# Patient Record
Sex: MALE | Race: WHITE | NOT HISPANIC OR LATINO | Employment: FULL TIME | URBAN - METROPOLITAN AREA
[De-identification: names, ages, dates, MRNs, and addresses within clinical notes are randomized per-mention and may not be internally consistent; named-entity substitution may affect disease eponyms.]

---

## 2017-11-13 ENCOUNTER — OFFICE VISIT (OUTPATIENT)
Dept: URGENT CARE | Facility: URGENT CARE | Age: 27
End: 2017-11-13
Payer: COMMERCIAL

## 2017-11-13 VITALS
OXYGEN SATURATION: 100 % | TEMPERATURE: 98.1 F | SYSTOLIC BLOOD PRESSURE: 149 MMHG | DIASTOLIC BLOOD PRESSURE: 95 MMHG | HEART RATE: 123 BPM

## 2017-11-13 DIAGNOSIS — R30.0 DYSURIA: Primary | ICD-10-CM

## 2017-11-13 LAB
ALBUMIN UR-MCNC: ABNORMAL MG/DL
APPEARANCE UR: CLEAR
BILIRUB UR QL STRIP: NEGATIVE
COLOR UR AUTO: YELLOW
GLUCOSE UR STRIP-MCNC: NEGATIVE MG/DL
HGB UR QL STRIP: ABNORMAL
KETONES UR STRIP-MCNC: ABNORMAL MG/DL
LEUKOCYTE ESTERASE UR QL STRIP: NEGATIVE
MUCOUS THREADS #/AREA URNS LPF: PRESENT /LPF
NITRATE UR QL: NEGATIVE
PH UR STRIP: 6.5 PH (ref 5–7)
RBC #/AREA URNS AUTO: ABNORMAL /HPF
SOURCE: ABNORMAL
SP GR UR STRIP: 1.01 (ref 1–1.03)
UROBILINOGEN UR STRIP-ACNC: 0.2 EU/DL (ref 0.2–1)
WBC #/AREA URNS AUTO: ABNORMAL /HPF

## 2017-11-13 PROCEDURE — 81001 URINALYSIS AUTO W/SCOPE: CPT | Performed by: FAMILY MEDICINE

## 2017-11-13 PROCEDURE — 87086 URINE CULTURE/COLONY COUNT: CPT | Performed by: FAMILY MEDICINE

## 2017-11-13 PROCEDURE — 99202 OFFICE O/P NEW SF 15 MIN: CPT | Performed by: FAMILY MEDICINE

## 2017-11-13 RX ORDER — GRANULES FOR ORAL 3 G/1
3 POWDER ORAL ONCE
Qty: 1 PACKET | Refills: 0 | Status: SHIPPED | OUTPATIENT
Start: 2017-11-13 | End: 2017-11-13

## 2017-11-13 NOTE — MR AVS SNAPSHOT
"              After Visit Summary   2017    Ky Herron    MRN: [id number]           Patient Information     Date Of Birth          1990        Visit Information        Provider Department      2017 6:45 PM Randall Velez MD [facility]        Today's Diagnoses     Dysuria    -  1       Follow-ups after your visit        Who to contact     If you have questions or need follow up information about today's clinic visit or your schedule please contact [facility] directly at [phone].  Normal or non-critical lab and imaging results will be communicated to you by [employer], letter or phone within 4 business days after the clinic has received the results. If you do not hear from us within 7 days, please contact the clinic through [employer] or phone. If you have a critical or abnormal lab result, we will notify you by phone as soon as possible.  Submit refill requests through [employer] or call your pharmacy and they will forward the refill request to us. Please allow 3 business days for your refill to be completed.          Additional Information About Your Visit        MyChart Information     [employer] lets you send messages to your doctor, view your test results, renew your prescriptions, schedule appointments and more. To sign up, go to [email or web address] . Click on \"Log in\" on the left side of the screen, which will take you to the Welcome page. Then click on \"Sign up Now\" on the right side of the page.     You will be asked to enter the access code listed below, as well as some personal information. Please follow the directions to create your username and password.     Your access code is: [email or web address]  Expires: 2018  7:59 PM     Your access code will  in 90 days. If you need help or a new code, please call your [facility] or [phone].        Care EveryWhere ID     This is your Care EveryWhere ID. This could be used by other " organizations to access your [place] medical records  [id number]        Your Vitals Were     Pulse Temperature Pulse Oximetry             123 98.1  F (36.7  C) (Tympanic) 100%          Blood Pressure from Last 3 Encounters:   11/13/17 (!) 149/95    Weight from Last 3 Encounters:   No data found for Wt              We Performed the Following     UA with Microscopic reflex to Culture     Urine Culture Aerobic Bacterial          Today's Medication Changes          These changes are accurate as of: 11/13/17  7:59 PM.  If you have any questions, ask your nurse or doctor.               Start taking these medicines.        Dose/Directions    fosfomycin 3 G Packet   Commonly known as:  MONUROL   Used for:  Dysuria        Dose:  3 g   Take 1 packet (3 g) by mouth once for 1 dose   Quantity:  1 packet   Refills:  0            Where to get your medicines      These medications were sent to [facility] [address] [place] [address] [address] [address]    Hours:  24-hours Phone:  [phone]     fosfomycin 3 G Packet                Primary Care Provider    Provider Outside       No address on file        Equal Access to Services     NISH VILCHIS : Hadii misti lopez hadasho Sovicenteali, waaxda luqadaha, qaybta kaalmada adeteresa, mercedes alvarenga . So [facility] [phone].    ATENCIÓN: Si habla español, tiene a johnson disposición servicios gratuitos de asistencia lingüística. Llame al [phone].    We comply with applicable federal civil rights laws and [place] laws. We do not discriminate on the basis of race, color, national origin, age, disability, sex, sexual orientation, or gender identity.            Thank you!     Thank you for choosing [facility]ARE  for your care. Our goal is always to provide you with excellent care. Hearing back from our patients is one way we can continue to improve our services. Please take a few  minutes to complete the written survey that you may receive in the mail after your visit with us. Thank you!             Your Updated Medication List - Protect others around you: Learn how to safely use, store and throw away your medicines at www.disposemymeds.org.          This list is accurate as of: 11/13/17  7:59 PM.  Always use your most recent med list.                   Brand Name Dispense Instructions for use Diagnosis    fosfomycin 3 G Packet    MONUROL    1 packet    Take 1 packet (3 g) by mouth once for 1 dose    Dysuria

## 2017-11-14 LAB
BACTERIA SPEC CULT: NO GROWTH
SPECIMEN SOURCE: NORMAL

## 2017-11-14 NOTE — NURSING NOTE
Chief Complaint   Patient presents with     Urgent Care     Groin Pain     Left testicle pain that started today.         Initial BP (!) 149/95 (BP Location: Right arm, Cuff Size: Adult Regular)  Pulse 123  Temp 98.1  F (36.7  C) (Tympanic)  SpO2 100% There is no height or weight on file to calculate BMI.  Medication Reconciliation: complete.  CY Cardenas

## 2017-11-14 NOTE — PROGRESS NOTES
SUBJECTIVE:  Chief Complaint   Patient presents with     Urgent Care     Groin Pain     Left testicle pain that started today.       Ky Herron is a 27 year old male   Who has concern of dysuria and L testicle pain  Patient has had no specific symptoms:  No discharge,  no fevers/ chills    Sexually active: no  Predisposing factors: none although had this one year ago that was treated in [place] with fosfomycin.   Saw urology and had negative prostate, scrotal and bladder USD.       ALLERGIES:  Review of patient's allergies indicates no known allergies.        No current outpatient prescriptions on file prior to visit.  No current facility-administered medications on file prior to visit.     Social History   Substance Use Topics     Smoking status: Not on file     Smokeless tobacco: Not on file     Alcohol use Not on file       No family history on file.      ROS:   CONSTITUTIONAL:NEGATIVE for fever, chills, change in weight  INTEGUMENTARY/SKIN: NEGATIVE for worrisome rashes, moles or lesions    OBJECTIVE:  BP (!) 149/95 (BP Location: Right arm, Cuff Size: Adult Regular)  Pulse 123  Temp 98.1  F (36.7  C) (Tympanic)  SpO2 100%    \ exam deferred  GENERAL APPEARANCE: healthy, alert and no distress  CV: regular rates and rhythm, normal S1 S2, no murmur noted  ABDOMEN:  soft, nontender, no HSM or masses and bowel sounds normal  BACK: No CVA tenderness  SKIN: no suspicious lesions or rashes    ASSESSMENT:  1. Dysuria    Needs to see pcp for further recs    - fosfomycin (MONUROL) 3 G Packet; Take 1 packet (3 g) by mouth once for 1 dose  Dispense: 1 packet; Refill: 0  - UA with Microscopic reflex to Culture  - Urine Culture Aerobic Bacterial    Push fluids.   Pt instructed to come back to the clinic for worsening sx

## 2023-08-04 ENCOUNTER — OFFICE VISIT (OUTPATIENT)
Dept: URGENT CARE | Facility: URGENT CARE | Age: 33
End: 2023-08-04
Payer: COMMERCIAL

## 2023-08-04 VITALS
OXYGEN SATURATION: 100 % | DIASTOLIC BLOOD PRESSURE: 86 MMHG | RESPIRATION RATE: 25 BRPM | HEART RATE: 106 BPM | TEMPERATURE: 97.5 F | SYSTOLIC BLOOD PRESSURE: 138 MMHG | WEIGHT: 193.6 LBS

## 2023-08-04 DIAGNOSIS — R07.0 THROAT PAIN: Primary | ICD-10-CM

## 2023-08-04 DIAGNOSIS — J06.9 VIRAL URI: ICD-10-CM

## 2023-08-04 DIAGNOSIS — R03.0 ELEVATED BP WITHOUT DIAGNOSIS OF HYPERTENSION: ICD-10-CM

## 2023-08-04 LAB
DEPRECATED S PYO AG THROAT QL EIA: NEGATIVE
ERYTHROCYTE [DISTWIDTH] IN BLOOD BY AUTOMATED COUNT: 11.6 % (ref 10–15)
GROUP A STREP BY PCR: NOT DETECTED
HCT VFR BLD AUTO: 46.7 % (ref 40–53)
HGB BLD-MCNC: 15.8 G/DL (ref 13.3–17.7)
MCH RBC QN AUTO: 29.5 PG (ref 26.5–33)
MCHC RBC AUTO-ENTMCNC: 33.8 G/DL (ref 31.5–36.5)
MCV RBC AUTO: 87 FL (ref 78–100)
PLATELET # BLD AUTO: 170 10E3/UL (ref 150–450)
RBC # BLD AUTO: 5.35 10E6/UL (ref 4.4–5.9)
WBC # BLD AUTO: 9.2 10E3/UL (ref 4–11)

## 2023-08-04 PROCEDURE — 85027 COMPLETE CBC AUTOMATED: CPT | Performed by: NURSE PRACTITIONER

## 2023-08-04 PROCEDURE — 99204 OFFICE O/P NEW MOD 45 MIN: CPT | Performed by: NURSE PRACTITIONER

## 2023-08-04 PROCEDURE — 36415 COLL VENOUS BLD VENIPUNCTURE: CPT | Performed by: NURSE PRACTITIONER

## 2023-08-04 PROCEDURE — 87651 STREP A DNA AMP PROBE: CPT | Performed by: NURSE PRACTITIONER

## 2023-08-04 NOTE — PROGRESS NOTES
Assessment & Plan     Throat pain  - Streptococcus A Rapid Screen w/Reflex to PCR - Clinic Collect  - Group A Streptococcus PCR Throat Swab  - CBC with platelets  - CBC with platelets    Viral URI     Patient Instructions     Results for orders placed or performed in visit on 08/04/23   CBC with platelets     Status: Normal   Result Value Ref Range    WBC Count 9.2 4.0 - 11.0 10e3/uL    RBC Count 5.35 4.40 - 5.90 10e6/uL    Hemoglobin 15.8 13.3 - 17.7 g/dL    Hematocrit 46.7 40.0 - 53.0 %    MCV 87 78 - 100 fL    MCH 29.5 26.5 - 33.0 pg    MCHC 33.8 31.5 - 36.5 g/dL    RDW 11.6 10.0 - 15.0 %    Platelet Count 170 150 - 450 10e3/uL   Streptococcus A Rapid Screen w/Reflex to PCR - Clinic Collect     Status: Normal    Specimen: Throat; Swab   Result Value Ref Range    Group A Strep antigen Negative Negative     BP remains elevated.  Recheck at home  Follow up with your PCP if this remains elevated.      RST negative  TC swab pending.    Push fluids  Lots of handwashing.   Ibuprofen as needed for fever or pain  Delsym or dayquil/nyquil for cough as needed     Rest as able.   Will call if any other labs positive.    F/u in the clinic if symptoms persist or worsen.          Return in about 1 week (around 8/11/2023) for with regular provider if symptoms persist.    BROWN Amezquita [facility] BARBIE Mcpherson is a 33 year old male who presents to clinic today for the following health issues:  Chief Complaint   Patient presents with    Pharyngitis     X3 days.     HPI    URI Adult    Onset of symptoms was 3 day(s) ago.  Course of illness is worsening.    Severity mild  Current and Associated symptoms: stuffy nose, sore throat, body aches, and fatigue  Treatment measures tried include Tylenol/Ibuprofen, Fluids, and Rest.  Predisposing factors include recent flu like symptoms, seemed to be getting better then sore throat came on.  .    BP up.    Has not checked this recently.    Denies chest pain, SANDY, SOB, dizziness or lightheadedness.  No pain radiating to left arm or jaw.  No reflux.        Review of Systems  Constitutional, HEENT, cardiovascular, pulmonary, gi and gu systems are negative, except as otherwise noted.      Objective    BP (!) 167/98   Pulse 106   Temp 97.5  F (36.4  C) (Tympanic)   Resp 25   Wt 87.8 kg (193 lb 9.6 oz)   SpO2 100%   Physical Exam   GENERAL: healthy, alert and no distress  EYES: Eyes grossly normal to inspection, PERRL and conjunctivae and sclerae normal  HENT: ear canals and TM's normal, nose and mouth without ulcers or lesions  NECK: no adenopathy, no asymmetry, masses, or scars and thyroid normal to palpation  RESP: lungs clear to auscultation - no rales, rhonchi or wheezes  CV: regular rate and rhythm, normal S1 S2, no S3 or S4, no murmur, click or rub, no peripheral edema and peripheral pulses strong  MS: no gross musculoskeletal defects noted, no edema  SKIN: no suspicious lesions or rashes

## 2023-08-04 NOTE — PATIENT INSTRUCTIONS
Results for orders placed or performed in visit on 08/04/23   CBC with platelets     Status: Normal   Result Value Ref Range    WBC Count 9.2 4.0 - 11.0 10e3/uL    RBC Count 5.35 4.40 - 5.90 10e6/uL    Hemoglobin 15.8 13.3 - 17.7 g/dL    Hematocrit 46.7 40.0 - 53.0 %    MCV 87 78 - 100 fL    MCH 29.5 26.5 - 33.0 pg    MCHC 33.8 31.5 - 36.5 g/dL    RDW 11.6 10.0 - 15.0 %    Platelet Count 170 150 - 450 10e3/uL   Streptococcus A Rapid Screen w/Reflex to PCR - Clinic Collect     Status: Normal    Specimen: Throat; Swab   Result Value Ref Range    Group A Strep antigen Negative Negative     BP remains elevated.  Recheck at home  Follow up with your PCP if this remains elevated.      RST negative  TC swab pending.    Push fluids  Lots of handwashing.   Ibuprofen as needed for fever or pain  Delsym or dayquil/nyquil for cough as needed     Rest as able.   Will call if any other labs positive.    F/u in the clinic if symptoms persist or worsen.

## 2023-08-06 ENCOUNTER — HOSPITAL ENCOUNTER (EMERGENCY)
Facility: CLINIC | Age: 33
Discharge: HOME | End: 2023-08-06
Attending: EMERGENCY MEDICINE | Admitting: EMERGENCY MEDICINE
Payer: COMMERCIAL

## 2023-08-06 VITALS
OXYGEN SATURATION: 97 % | RESPIRATION RATE: 18 BRPM | TEMPERATURE: 98.7 F | HEART RATE: 97 BPM | DIASTOLIC BLOOD PRESSURE: 99 MMHG | SYSTOLIC BLOOD PRESSURE: 149 MMHG

## 2023-08-06 DIAGNOSIS — I10 HYPERTENSION, UNSPECIFIED TYPE: ICD-10-CM

## 2023-08-06 DIAGNOSIS — J02.9 PHARYNGITIS, UNSPECIFIED ETIOLOGY: ICD-10-CM

## 2023-08-06 LAB — GROUP A STREP BY PCR: NOT DETECTED

## 2023-08-06 PROCEDURE — 99284 EMERGENCY DEPT VISIT MOD MDM: CPT | Mod: 25

## 2023-08-06 PROCEDURE — 87651 STREP A DNA AMP PROBE: CPT | Performed by: EMERGENCY MEDICINE

## 2023-08-06 PROCEDURE — 96372 THER/PROPH/DIAG INJ SC/IM: CPT | Performed by: EMERGENCY MEDICINE

## 2023-08-06 PROCEDURE — 250N000011 HC RX IP 250 OP 636: Mod: JZ | Performed by: EMERGENCY MEDICINE

## 2023-08-06 RX ORDER — KETOROLAC TROMETHAMINE 15 MG/ML
30 INJECTION, SOLUTION INTRAMUSCULAR; INTRAVENOUS ONCE
Status: COMPLETED | OUTPATIENT
Start: 2023-08-06 | End: 2023-08-06

## 2023-08-06 RX ADMIN — KETOROLAC TROMETHAMINE 30 MG: 15 INJECTION, SOLUTION INTRAMUSCULAR; INTRAVENOUS at 00:52

## 2023-08-06 ASSESSMENT — ACTIVITIES OF DAILY LIVING (ADL): ADLS_ACUITY_SCORE: 35

## 2023-08-06 NOTE — ED PROVIDER NOTES
History     Chief Complaint:  Pharyngitis        HPI   Ky Herron is a 33 year old male who presents with a sore throat.   has been doing a sore throat since 8/1 is progressively gotten worse.   has been using Tylenol with the last dose around 2000 this evening.  He has not been taking any ibuprofen.  He measured a temperature of 99.6 F tonight, but it has not seen anything higher.  He states that he feels like he has had some congestion and some postnasal drip.  He gone to the urgent care who did a strep swab which was negative he was recommended supportive care at that time.  He has been able to drink fluid.      Review of External Notes:    note 8/4/23 discussing sore throat and negative strep swab    Allergies:  No Known Allergies     Medications:    No current outpatient medications on file.      Past Medical History:    No past medical history on file.    Past Surgical History:    No past surgical history on file.     Family History:    family history is not on file.    Social History:   reports that he has never smoked. He has never used smokeless tobacco.  PCP: Outside, Provider     Physical Exam   Patient Vitals for the past 24 hrs:   BP Temp Temp src Pulse Resp SpO2   08/06/23 0220 -- -- -- -- 18 97 %   08/06/23 0215 (!) 149/99 -- -- 97 18 97 %   08/06/23 0055 -- -- -- -- -- 95 %   08/06/23 0031 -- 98.7  F (37.1  C) Oral -- 16 --   08/06/23 0030 -- -- -- -- -- 99 %   08/06/23 0028 (!) 164/111 -- -- (!) 123 -- --        Physical Exam  General: Appears well-developed and well-nourished.   Head: No signs of trauma.   Mouth/Throat: Oropharynx with erythema and exudate of bilateral tonsils.  No significant swelling. Uvula midline  Eyes: Conjunctivae are normal.   Neck: Normal range of motion. No nuchal rigidity. + cervical adenopathy  CV: Normal rate and regular rhythm.    Resp: Effort normal and breath sounds normal. No respiratory distress. ss.  MSK: Normal range of motion.   Neuro: The  patient is alert and oriented. Speech normal.  Skin: Skin is warm and dry. No rash noted.   Psych: normal mood and affect. behavior is normal.       Emergency Department Course     Laboratory:  Labs Ordered and Resulted from Time of ED Arrival to Time of ED Departure   GROUP A STREPTOCOCCUS PCR THROAT SWAB - Normal       Result Value    Group A strep by PCR Not Detected          Emergency Department Course & Assessments:    Interventions:  Medications   ketorolac (TORADOL) injection 30 mg (30 mg Intramuscular $Given 8/6/23 0059)        Social Determinants of Health affecting care:   None    Disposition:  The patient was discharged to home.     Impression & Plan    Medical Decision Making:  Ky Herron is a 33 year old male presents with a sore throat.  He states that he has had a sore throat with some congestion over the last number of days and feels like its been getting worse.  He had been seen at urgent care and had a negative strep test.  Given his continued symptoms he came to the ER.  On my evaluation he did have some erythema and exudate bilaterally.  I repeated the strep test and this continue did not be positive for strep.  Clinically I do not suspect further deep space infection.  Patient reports that he has been taking Tylenol but not ibuprofen.  He was given a shot of Toradol and did feel better with this.  Patient does report having a feeling of some postnasal drip, and this may be contributing to his sore throat.  He was discharged home with recommendation to use both Tylenol and ibuprofen.  I also discussed using Mucinex and a Brooke pot to help with mucus.  He was recommended to follow-up in clinic return for further concerns.  Patient's blood pressure was noted to be elevated, which the patient is aware of.  He was recommended to follow-up regarding this.      Diagnosis:    ICD-10-CM    1. Pharyngitis, unspecified etiology  J02.9       2. Hypertension, unspecified type  I10            Discharge  Medications:  There are no discharge medications for this patient.          Ashvin Hightower MD  08/06/23 0538       Ashvin Hightower MD  08/06/23 0538

## 2023-08-06 NOTE — ED TRIAGE NOTES
Triage Assessment       Row Name 08/06/23 0028       Triage Assessment (Adult)    Airway WDL WDL       Respiratory WDL    Respiratory WDL WDL       Skin Circulation/Temperature WDL    Skin Circulation/Temperature WDL WDL       Cardiac WDL    Cardiac WDL WDL       Peripheral/Neurovascular WDL    Peripheral Neurovascular WDL WDL       Cognitive/Neuro/Behavioral WDL    Cognitive/Neuro/Behavioral WDL WDL                Pt. Has had sore throat and bilat. Ear discomfort for the last few days. Unknown if fever. Pt at  and had strep test done. Pt had fever this afternoon with increase in pain